# Patient Record
Sex: MALE | URBAN - METROPOLITAN AREA
[De-identification: names, ages, dates, MRNs, and addresses within clinical notes are randomized per-mention and may not be internally consistent; named-entity substitution may affect disease eponyms.]

---

## 2023-01-16 ENCOUNTER — APPOINTMENT (RX ONLY)
Dept: URBAN - METROPOLITAN AREA CLINIC 46 | Facility: CLINIC | Age: 68
Setting detail: DERMATOLOGY
End: 2023-01-16

## 2023-01-16 DIAGNOSIS — L71.0 PERIORAL DERMATITIS: ICD-10-CM

## 2023-01-16 DIAGNOSIS — L24 IRRITANT CONTACT DERMATITIS: ICD-10-CM

## 2023-01-16 PROBLEM — L24.9 IRRITANT CONTACT DERMATITIS, UNSPECIFIED CAUSE: Status: ACTIVE | Noted: 2023-01-16

## 2023-01-16 PROCEDURE — ? PRESCRIPTION MEDICATION MANAGEMENT

## 2023-01-16 PROCEDURE — ? ADDITIONAL NOTES

## 2023-01-16 PROCEDURE — 99203 OFFICE O/P NEW LOW 30 MIN: CPT

## 2023-01-16 PROCEDURE — ? PRESCRIPTION

## 2023-01-16 PROCEDURE — ? COUNSELING

## 2023-01-16 RX ORDER — DESONIDE 0.5 MG/G
CREAM TOPICAL
Qty: 60 | Refills: 0 | Status: ERX | COMMUNITY
Start: 2023-01-16

## 2023-01-16 RX ADMIN — DESONIDE: 0.5 CREAM TOPICAL at 00:00

## 2023-01-16 NOTE — PROCEDURE: ADDITIONAL NOTES
Additional Notes: Patient was given aquaphor samples
Render Risk Assessment In Note?: no
Detail Level: Simple